# Patient Record
Sex: MALE | Race: WHITE | NOT HISPANIC OR LATINO | Employment: OTHER | URBAN - METROPOLITAN AREA
[De-identification: names, ages, dates, MRNs, and addresses within clinical notes are randomized per-mention and may not be internally consistent; named-entity substitution may affect disease eponyms.]

---

## 2017-03-28 ENCOUNTER — OFFICE VISIT (OUTPATIENT)
Dept: INTERNAL MEDICINE | Facility: CLINIC | Age: 77
End: 2017-03-28
Payer: MEDICARE

## 2017-03-28 VITALS
DIASTOLIC BLOOD PRESSURE: 74 MMHG | WEIGHT: 126.13 LBS | HEART RATE: 68 BPM | BODY MASS INDEX: 22.34 KG/M2 | TEMPERATURE: 98 F | SYSTOLIC BLOOD PRESSURE: 124 MMHG

## 2017-03-28 DIAGNOSIS — G31.84 MILD COGNITIVE IMPAIRMENT WITH MEMORY LOSS: ICD-10-CM

## 2017-03-28 DIAGNOSIS — Z00.00 ROUTINE GENERAL MEDICAL EXAMINATION AT A HEALTH CARE FACILITY: Primary | ICD-10-CM

## 2017-03-28 DIAGNOSIS — Z72.0 TOBACCO USE: ICD-10-CM

## 2017-03-28 DIAGNOSIS — E78.5 HYPERLIPIDEMIA, UNSPECIFIED HYPERLIPIDEMIA TYPE: ICD-10-CM

## 2017-03-28 PROCEDURE — 99999 PR PBB SHADOW E&M-EST. PATIENT-LVL III: CPT | Mod: PBBFAC,,, | Performed by: FAMILY MEDICINE

## 2017-03-28 PROCEDURE — 99499 UNLISTED E&M SERVICE: CPT | Mod: S$GLB,,, | Performed by: FAMILY MEDICINE

## 2017-03-28 PROCEDURE — 99397 PER PM REEVAL EST PAT 65+ YR: CPT | Mod: S$GLB,,, | Performed by: FAMILY MEDICINE

## 2017-03-29 NOTE — PROGRESS NOTES
Subjective:       Patient ID: Lavon Vanegas is a 77 y.o. male.    Chief Complaint: Annual Exam    HPI Comments: Patient presents to clinic today for annual physical exam. Granddaughter is present today to translate. She reports patient is being treated for dementia by Neurology.    Review of Systems   Constitutional: Negative for chills, fatigue, fever and unexpected weight change.   HENT: Negative for congestion, dental problem, ear pain, hearing loss, rhinorrhea and trouble swallowing.    Eyes: Negative for pain and visual disturbance.   Respiratory: Negative for cough and shortness of breath.    Cardiovascular: Negative for chest pain, palpitations and leg swelling.   Gastrointestinal: Negative for abdominal distention, abdominal pain, blood in stool, constipation, diarrhea, nausea and vomiting.   Genitourinary: Negative for difficulty urinating, scrotal swelling and testicular pain.   Musculoskeletal: Negative for arthralgias and myalgias.   Skin: Negative for rash.   Neurological: Negative for dizziness, weakness, numbness and headaches.   Hematological: Negative for adenopathy. Does not bruise/bleed easily.   Psychiatric/Behavioral: Negative for dysphoric mood and sleep disturbance. The patient is not nervous/anxious.        Objective:      Physical Exam   Constitutional: He is oriented to person, place, and time. He appears well-developed and well-nourished. No distress.   HENT:   Head: Normocephalic and atraumatic.   Right Ear: Hearing, tympanic membrane, external ear and ear canal normal.   Left Ear: Hearing, tympanic membrane, external ear and ear canal normal.   Nose: Nose normal.   Mouth/Throat: Uvula is midline, oropharynx is clear and moist and mucous membranes are normal.   Eyes: Conjunctivae, EOM and lids are normal. Pupils are equal, round, and reactive to light. No scleral icterus.   Neck: Normal range of motion. Neck supple. No thyromegaly present.   Cardiovascular: Normal rate and regular rhythm.  Exam  reveals no gallop and no friction rub.    No murmur heard.  Pulmonary/Chest: Effort normal and breath sounds normal. He has no wheezes. He has no rales.   Abdominal: Soft. Bowel sounds are normal. He exhibits no distension and no mass. There is no hepatosplenomegaly. There is no tenderness.   Musculoskeletal: Normal range of motion. He exhibits no edema or tenderness.   Lymphadenopathy:     He has no cervical adenopathy.   Neurological: He is alert and oriented to person, place, and time.   Reflex Scores:       Patellar reflexes are 2+ on the right side and 2+ on the left side.  Skin: Skin is warm and dry. No rash noted.   Psychiatric: He has a normal mood and affect.   Vitals reviewed.      Assessment:       1. Routine general medical examination at a health care facility    2. Hyperlipidemia, unspecified hyperlipidemia type    3. Tobacco use    4. Mild cognitive impairment with memory loss        Plan:   Doll was seen today for annual exam.    Diagnoses and all orders for this visit:    Routine general medical examination at a health care facility    Hyperlipidemia, unspecified hyperlipidemia type  Comments:  patient/family do not desire to continue labs/monitoring    Tobacco use  Comments:  encouraged cessation, patient does not want to quit, family states they have tried to get him to stop without success    Mild cognitive impairment with memory loss  Comments:  followed by Neurology    - Health Maintenance reviewed. Family/patient do not wish to proceed with health screenings at this time. Advised to notify office if they change their mind.   - Return prn

## 2017-07-12 ENCOUNTER — OFFICE VISIT (OUTPATIENT)
Dept: INTERNAL MEDICINE | Facility: CLINIC | Age: 77
End: 2017-07-12
Payer: MEDICARE

## 2017-07-12 VITALS
WEIGHT: 122.13 LBS | HEIGHT: 63 IN | HEART RATE: 57 BPM | DIASTOLIC BLOOD PRESSURE: 72 MMHG | TEMPERATURE: 98 F | BODY MASS INDEX: 21.64 KG/M2 | OXYGEN SATURATION: 99 % | SYSTOLIC BLOOD PRESSURE: 150 MMHG

## 2017-07-12 DIAGNOSIS — F03.91 DEMENTIA WITH BEHAVIORAL DISTURBANCE, UNSPECIFIED DEMENTIA TYPE: ICD-10-CM

## 2017-07-12 DIAGNOSIS — F03.91 DEMENTIA WITH BEHAVIORAL DISTURBANCE, UNSPECIFIED DEMENTIA TYPE: Primary | ICD-10-CM

## 2017-07-12 PROCEDURE — 99214 OFFICE O/P EST MOD 30 MIN: CPT | Mod: S$GLB,,, | Performed by: FAMILY MEDICINE

## 2017-07-12 PROCEDURE — 99999 PR PBB SHADOW E&M-EST. PATIENT-LVL IV: CPT | Mod: PBBFAC,,, | Performed by: FAMILY MEDICINE

## 2017-07-12 PROCEDURE — 1159F MED LIST DOCD IN RCRD: CPT | Mod: S$GLB,,, | Performed by: FAMILY MEDICINE

## 2017-07-12 PROCEDURE — 99499 UNLISTED E&M SERVICE: CPT | Mod: S$GLB,,, | Performed by: FAMILY MEDICINE

## 2017-07-12 PROCEDURE — 1126F AMNT PAIN NOTED NONE PRSNT: CPT | Mod: S$GLB,,, | Performed by: FAMILY MEDICINE

## 2017-07-12 RX ORDER — QUETIAPINE FUMARATE 25 MG/1
25 TABLET, FILM COATED ORAL NIGHTLY
Qty: 30 TABLET | Refills: 2 | Status: SHIPPED | OUTPATIENT
Start: 2017-07-12 | End: 2017-07-31 | Stop reason: SDUPTHER

## 2017-07-12 RX ORDER — QUETIAPINE FUMARATE 25 MG/1
25 TABLET, FILM COATED ORAL NIGHTLY
Qty: 30 TABLET | Refills: 2 | Status: SHIPPED | OUTPATIENT
Start: 2017-07-12 | End: 2017-07-12 | Stop reason: SDUPTHER

## 2017-07-12 RX ORDER — QUETIAPINE FUMARATE 25 MG/1
25 TABLET, FILM COATED ORAL NIGHTLY
Qty: 30 TABLET | Refills: 2 | Status: CANCELLED | OUTPATIENT
Start: 2017-07-12 | End: 2018-07-12

## 2017-07-12 NOTE — PROGRESS NOTES
Subjective:       Patient ID: Lavon Vanegas is a 77 y.o. male.    Chief Complaint: Follow-up (ER visit) and Mental Health Problem    Patient presents to clinic today with his daughter, Sarah, and caregiver from assisted living facility. He was recently evaluated at [facility] for agitation/psychosis. I received paperwork from [facility] that shows they advised he follow up with PCP and psychiatry. No follow up planned with psychiatry per family report. Caregiver at assisted living facility states that patient does well most of the time, but gets agitated in the evenings and has been combative at times. Family reports he was brought to the ER by law enforcement in handcuffs and stated that he wanted to kill his wife. They report he said this as he was angry about being handcuffed and brought to ER. They reports he was discharged last Wednesday and he has been doing okay, they report agitation in the evenings. They have been giving him tylenol PM, but are unclear on who recommended that. No other medications were started at [facility] per family.      Review of Systems   Constitutional: Negative for chills, fatigue, fever and unexpected weight change.   Eyes: Negative for visual disturbance.   Respiratory: Negative for shortness of breath.    Cardiovascular: Negative for chest pain.   Musculoskeletal: Negative for myalgias.   Neurological: Negative for headaches.   Psychiatric/Behavioral: Positive for agitation, behavioral problems and sleep disturbance. Negative for dysphoric mood, hallucinations, self-injury and suicidal ideas. The patient is not nervous/anxious.        Objective:      Physical Exam   Constitutional: He appears well-developed and well-nourished. No distress.   Pleasant elderly gentleman   HENT:   Head: Normocephalic and atraumatic.   Eyes: Conjunctivae and EOM are normal. Pupils are equal, round, and reactive to light. No scleral icterus.   Pulmonary/Chest: Effort normal.   Neurological: He is alert. No cranial nerve  deficit. Gait normal.   Oriented to person   Psychiatric: He has a normal mood and affect. His behavior is normal. Cognition and memory are impaired. He expresses no homicidal and no suicidal ideation.   Vitals reviewed.      Assessment:       1. Dementia with behavioral disturbance, unspecified dementia type        Plan:   Lavon was seen today for follow-up and mental health problem.    Diagnoses and all orders for this visit:    Dementia with behavioral disturbance, unspecified dementia type  -     Ambulatory Referral to Neurology  -     Ambulatory Referral to Psychiatry  -     quetiapine (SEROQUEL) 25 MG Tab; Take 1 tablet (25 mg total) by mouth every evening.    Other orders  -     Cancel: quetiapine (SEROQUEL) 25 MG Tab; Take 1 tablet (25 mg total) by mouth every evening.    - paperwork reviewed from [facility]  - Will start patient on seroquel 25 mg qhs. Discussed with daughter and caregiver that his agitation/combativeness are consistent with sundowning in elderly dementia patient. - - - Will schedule follow up with Dr. Cobb. Will also have him see Psychiatry for opinion as well.  - Discussed with family that assisted living facility is not the safest place for him to be. Advised they look into placing him in nursing home dementia unit. Daughter expressed understanding.

## 2017-07-31 ENCOUNTER — OFFICE VISIT (OUTPATIENT)
Dept: NEUROLOGY | Facility: CLINIC | Age: 77
End: 2017-07-31
Payer: MEDICARE

## 2017-07-31 VITALS
SYSTOLIC BLOOD PRESSURE: 148 MMHG | HEART RATE: 60 BPM | DIASTOLIC BLOOD PRESSURE: 70 MMHG | BODY MASS INDEX: 22.61 KG/M2 | HEIGHT: 63 IN | WEIGHT: 127.63 LBS

## 2017-07-31 DIAGNOSIS — G30.1 LATE ONSET ALZHEIMER'S DISEASE WITH BEHAVIORAL DISTURBANCE: ICD-10-CM

## 2017-07-31 DIAGNOSIS — F03.91 DEMENTIA WITH BEHAVIORAL DISTURBANCE, UNSPECIFIED DEMENTIA TYPE: ICD-10-CM

## 2017-07-31 DIAGNOSIS — F02.818 LATE ONSET ALZHEIMER'S DISEASE WITH BEHAVIORAL DISTURBANCE: ICD-10-CM

## 2017-07-31 PROCEDURE — 1126F AMNT PAIN NOTED NONE PRSNT: CPT | Mod: S$GLB,,, | Performed by: PSYCHIATRY & NEUROLOGY

## 2017-07-31 PROCEDURE — 99999 PR PBB SHADOW E&M-EST. PATIENT-LVL III: CPT | Mod: PBBFAC,,, | Performed by: PSYCHIATRY & NEUROLOGY

## 2017-07-31 PROCEDURE — 99499 UNLISTED E&M SERVICE: CPT | Mod: S$GLB,,, | Performed by: PSYCHIATRY & NEUROLOGY

## 2017-07-31 PROCEDURE — 1159F MED LIST DOCD IN RCRD: CPT | Mod: S$GLB,,, | Performed by: PSYCHIATRY & NEUROLOGY

## 2017-07-31 PROCEDURE — 99214 OFFICE O/P EST MOD 30 MIN: CPT | Mod: S$GLB,,, | Performed by: PSYCHIATRY & NEUROLOGY

## 2017-07-31 RX ORDER — QUETIAPINE FUMARATE 50 MG/1
50 TABLET, FILM COATED ORAL NIGHTLY
Qty: 30 TABLET | Refills: 11 | Status: SHIPPED | OUTPATIENT
Start: 2017-07-31 | End: 2017-10-17 | Stop reason: SDUPTHER

## 2017-07-31 NOTE — LETTER
July 31, 2017    Doll Vanegas  [address] [address] [place] [address]             [facility]  [id number] [place] [address] Drive  [place] [address]  Phone: [phone]  Fax: [phone] Dear Mr. Vanegas:    You have developed dementia of the Alzheimer type and now require 24 hour a day care and supervision.  You cannot be left alone for any period of time.  You cannot drive a car.  Your family will have to manage your medications and provide care 24 hours a day for your safety.      If you have any questions or concerns, please don't hesitate to call.    Sincerely,        Gene Cobb MD

## 2017-07-31 NOTE — PROGRESS NOTES
This is a 77-year-old right-handed patient who has been previously followed by members of this department as well as this examiner for dementia of the Alzheimer type with late onset.  The patient returns with family members today who are acting as  but also who are the caregivers.  Patient does not speak English.    The family reports that the patient is now become increasingly angry and agitated.  He is even struck out at his wife on several occasions.  He was placed in an assisted living center, but only stay 2 weeks as he was very difficult to control.  Apparently, the patient was walking into other clients rooms.  He would get up and night and leave the facility.  He was agitated at times.  Because of the lack of control, he was brought back home and is now staying and a relative's home with 24-hour supervision.  For the last 2 weeks, he has been at the family home with supervision.    The patient's daughter-in-law indicates that the patient continues to have periods of agitation and restlessness.  He apparently is getting up at night and wandering around the home.  Also, he has been  from his wife because he becomes extremely agitated and tries to strike out her when they are left alone.  The family also indicates that when looking in a mirror in the bathroom, he will become confused as to who is in the mirror and begins talking to the image as if it were his father.  At other times, the image apparently is not someone he has good feelings 4 and will begin to strike at the mirror.    The patient has had some periods of incontinence of urine.  This does not occur very frequently.  He requires supervision and assistance for other activities of daily living.    The patient's past history, family history, and social history have been reviewed with the patient and the family members.  No changes are being made.    Review of systems is not possible with the patient is a does not speak English and  is very confused and disoriented.    PE:   VITAL SIGNS: Blood pressure 148/70, pulse 66, weight 57.9 kg, height 5 foot 3 inches, BMI 22.61  APPEARANCE: Well nourished, well developed, in no acute distress.  The patient was very quiet and facet quietly in his chair.  HEAD: Normocephalic, atraumatic.  EYES: PERRL. EOMI.  Non-icteric sclerae.    EARS: TM's intact. Light reflex normal. No retraction or perforation.    NOSE: Mucosa pink. Airway clear.  MOUTH & THROAT: No tonsillar enlargement. No pharyngeal erythema or exudate. No stridor.  NECK: Supple. No bruits.  CHEST: Lungs clear to auscultation.  CARDIOVASCULAR: Regular rhythm without significant murmurs.  ABDOMEN: Bowel sounds normal. Not distended. Soft. No tenderness or masses.  MUSCULOSKELETAL:  No bony deformity seen.    NEUROLOGIC:   Mental Status:   The patient is attentive to the environment and cooperative for the exam.  The patient has extreme difficulty even following one-step commands even with a .    Cranial Nerves: II-XII grossly intact. Fundoscopic exam was not possible due to lack of patient cooperation.  The extraocular muscles are intact in the cardinal directions of gaze.  No ptosis is present. Facial features are symmetrical.  Speech is normal in fluency, diction, and phrasing.  Tongue protrudes in the midline.    Gait and Station:  Good alternate armswing with normal gait.  Motor:  No downdrift of either arm when held at shoulder level.  Manual muscle testing of proximal and distal muscles of both upper and lower extremities is normal.  Motor examination may not be entirely reliable as patient was marginally cooperative.  Sensory: Sensory examination is unreliable.  Cerebellar:  Finger to nose done well.    No involuntary movements or tremor seen.  Reflexes:  Stretch reflexes are 2+ both upper and lower extremities.  Plantar stimulation is flexor bilaterally and no pathological reflexes are seen      ASSESSMENT:  1.  Alzheimer-like  dementia, late onset, with behavioral disorder    RECOMMENDATIONS:  1.  Increase Seroquel to 50 mg at bedtime  2.  The family was counseled that he may need nursing home placement.  At the present time, the family indicates that there are no family members to provide care for him 24 hours a day.  However if safety becomes an issue, would strongly recommend nursing home placement.

## 2017-07-31 NOTE — LETTER
July 31, 2017      Taylor Bone MD  [address] [place] [address] [address] [place] [address]           [facility]  [address] [place] [address] [place] [address]  Phone: [phone]  Fax: [phone]          Patient: Lavon Vanegas   MR Number: [id number]   YOB: 1940   Date of Visit: 7/31/2017       Dear Dr. Taylor Bone:    Thank you for referring Doll Vanegas to me for evaluation. Attached you will find relevant portions of my assessment and plan of care.    If you have questions, please do not hesitate to call me. I look forward to following Doll Vanegas along with you.    Sincerely,    Gene Cobb MD    Enclosure  CC:  No Recipients    If you would like to receive this communication electronically, please contact [email or web address] or [phone] to request more information on [employer] Link access.    For providers and/or their staff who would like to refer a patient to [facility], please contact us through our one-stop-shop provider referral line, [facility] , at [phone].    If you feel you have received this communication in error or would no longer like to receive these types of communications, please e-mail [email or web address]

## 2017-08-02 ENCOUNTER — HOSPITAL ENCOUNTER (OUTPATIENT)
Dept: RADIOLOGY | Facility: HOSPITAL | Age: 77
Discharge: HOME | End: 2017-08-02
Attending: PSYCHIATRY & NEUROLOGY
Payer: MEDICARE

## 2017-08-02 DIAGNOSIS — F02.818 LATE ONSET ALZHEIMER'S DISEASE WITH BEHAVIORAL DISTURBANCE: ICD-10-CM

## 2017-08-02 DIAGNOSIS — G30.1 LATE ONSET ALZHEIMER'S DISEASE WITH BEHAVIORAL DISTURBANCE: ICD-10-CM

## 2017-08-02 PROCEDURE — 70450 CT HEAD/BRAIN W/O DYE: CPT | Mod: TC

## 2017-08-08 DIAGNOSIS — G31.84 MILD COGNITIVE IMPAIRMENT WITH MEMORY LOSS: ICD-10-CM

## 2017-08-08 RX ORDER — RIVASTIGMINE TARTRATE 1.5 MG/1
CAPSULE ORAL
Qty: 60 CAPSULE | Refills: 11 | Status: SHIPPED | OUTPATIENT
Start: 2017-08-08 | End: 2018-06-11 | Stop reason: SDUPTHER

## 2017-08-08 NOTE — TELEPHONE ENCOUNTER
----- Message from Marina Pham LPN sent at 8/8/2017  1:27 PM CDT -----  Contact: pt    ----- Message -----  From: Zoe Husain  Sent: 8/8/2017  12:56 PM  To: Lizandro WRIGHT Staff    .1. What is the name of the medication you are requesting? Rivastigmine  2. What is the dose? ?  3. How do you take the medication? Orally, topically, etc? orally  4. How often do you take this medication? ?  5. Do you need a 30 day or 90 day supply? ?  6. How many refills are you requesting? ?  7. What is your preferred pharmacy and location of the pharmacy? .  [facility] [address] [facility] [place] [facility] [address] [address] [facility]  [address] [address] [facility] [address]  Phone: [phone] Fax: [phone]  8. Who can we contact with further questions? Pt at [phone]    Thank you

## 2017-08-16 ENCOUNTER — OFFICE VISIT (OUTPATIENT)
Dept: INTERNAL MEDICINE | Facility: CLINIC | Age: 77
End: 2017-08-16
Payer: MEDICARE

## 2017-08-16 VITALS
OXYGEN SATURATION: 97 % | BODY MASS INDEX: 23.25 KG/M2 | HEIGHT: 63 IN | HEART RATE: 62 BPM | SYSTOLIC BLOOD PRESSURE: 160 MMHG | TEMPERATURE: 98 F | DIASTOLIC BLOOD PRESSURE: 80 MMHG | WEIGHT: 131.19 LBS

## 2017-08-16 DIAGNOSIS — Z72.0 TOBACCO USE: ICD-10-CM

## 2017-08-16 DIAGNOSIS — I10 ESSENTIAL HYPERTENSION: Primary | ICD-10-CM

## 2017-08-16 DIAGNOSIS — G30.1 LATE ONSET ALZHEIMER'S DISEASE WITH BEHAVIORAL DISTURBANCE: ICD-10-CM

## 2017-08-16 DIAGNOSIS — F02.818 LATE ONSET ALZHEIMER'S DISEASE WITH BEHAVIORAL DISTURBANCE: ICD-10-CM

## 2017-08-16 PROCEDURE — 1126F AMNT PAIN NOTED NONE PRSNT: CPT | Mod: S$GLB,,, | Performed by: FAMILY MEDICINE

## 2017-08-16 PROCEDURE — 99999 PR PBB SHADOW E&M-EST. PATIENT-LVL III: CPT | Mod: PBBFAC,,, | Performed by: FAMILY MEDICINE

## 2017-08-16 PROCEDURE — 99499 UNLISTED E&M SERVICE: CPT | Mod: S$GLB,,, | Performed by: FAMILY MEDICINE

## 2017-08-16 PROCEDURE — 1159F MED LIST DOCD IN RCRD: CPT | Mod: S$GLB,,, | Performed by: FAMILY MEDICINE

## 2017-08-16 PROCEDURE — 3008F BODY MASS INDEX DOCD: CPT | Mod: S$GLB,,, | Performed by: FAMILY MEDICINE

## 2017-08-16 PROCEDURE — 99214 OFFICE O/P EST MOD 30 MIN: CPT | Mod: S$GLB,,, | Performed by: FAMILY MEDICINE

## 2017-08-16 RX ORDER — AMLODIPINE BESYLATE 5 MG/1
5 TABLET ORAL DAILY
Qty: 30 TABLET | Refills: 5 | Status: SHIPPED | OUTPATIENT
Start: 2017-08-16 | End: 2017-09-06

## 2017-08-16 RX ORDER — AMLODIPINE BESYLATE 5 MG/1
5 TABLET ORAL DAILY
Qty: 30 TABLET | Refills: 5 | Status: SHIPPED | OUTPATIENT
Start: 2017-08-16 | End: 2017-08-16 | Stop reason: SDUPTHER

## 2017-08-16 NOTE — PATIENT INSTRUCTIONS
Seek immediate medical attention for severe headache, vision changes, chest pain, shortness of breath, speech difficulty, altered mental status, tingling/numbness/weakness or other focal neurologic deficits.    Amlodipine tablets  What is this medicine?  AMLODIPINE (am GUILLERMINA andrade) is a calcium-channel blocker. It affects the amount of calcium found in your heart and muscle cells. This relaxes your blood vessels, which can reduce the amount of work the heart has to do. This medicine is used to lower high blood pressure. It is also used to prevent chest pain.  How should I use this medicine?  Take this medicine by mouth with a glass of water. Follow the directions on the prescription label. Take your medicine at regular intervals. Do not take more medicine than directed.  Talk to your pediatrician regarding the use of this medicine in children. Special care may be needed. This medicine has been used in children as young as 6.  Persons over 65 years old may have a stronger reaction to this medicine and need smaller doses.  What side effects may I notice from receiving this medicine?  Side effects that you should report to your doctor or health care professional as soon as possible:  · allergic reactions like skin rash, itching or hives, swelling of the face, lips, or tongue  · breathing problems  · changes in vision or hearing  · chest pain  · fast, irregular heartbeat  · swelling of legs or ankles  Side effects that usually do not require medical attention (report to your doctor or health care professional if they continue or are bothersome):  · dry mouth  · facial flushing  · nausea, vomiting  · stomach gas, pain  · tired, weak  · trouble sleeping  What may interact with this medicine?  · herbal or dietary supplements  · local or general anesthetics  · medicines for high blood pressure  · medicines for prostate problems  · rifampin  What if I miss a dose?  If you miss a dose, take it as soon as you can. If it is  almost time for your next dose, take only that dose. Do not take double or extra doses.  Where should I keep my medicine?  Keep out of the reach of children.  Store at room temperature between 59 and 86 degrees F (15 and 30 degrees C). Protect from light. Keep container tightly closed. Throw away any unused medicine after the expiration date.  What should I tell my health care provider before I take this medicine?  They need to know if you have any of these conditions:  · heart problems like heart failure or aortic stenosis  · liver disease  · an unusual or allergic reaction to amlodipine, other medicines, foods, dyes, or preservatives  · pregnant or trying to get pregnant  · breast-feeding  What should I watch for while using this medicine?  Visit your doctor or health care professional for regular check ups. Check your blood pressure and pulse rate regularly. Ask your health care professional what your blood pressure and pulse rate should be, and when you should contact him or her.  This medicine may make you feel confused, dizzy or lightheaded. Do not drive, use machinery, or do anything that needs mental alertness until you know how this medicine affects you. To reduce the risk of dizzy or fainting spells, do not sit or stand up quickly, especially if you are an older patient. Avoid alcoholic drinks; they can make you more dizzy.  Do not suddenly stop taking amlodipine. Ask your doctor or health care professional how you can gradually reduce the dose.  Date Last Reviewed:   NOTE:This sheet is a summary. It may not cover all possible information. If you have questions about this medicine, talk to your doctor, pharmacist, or health care provider. Copyright© 2016 [employer]

## 2017-08-16 NOTE — PROGRESS NOTES
Subjective:       Patient ID: Lavon Vanegas is a 77 y.o. male.    Chief Complaint: Follow-up (blood pressure check)    Patient presents to clinic today for followup of blood pressure. Family reports patient is living at home with his son who watches him 24 hours a day. They are interested in hiring 24 hour sitter. They are not yet interested in nursing home placement. Family reports that he talks about sex all the time. They reports his agitation/aggressiveness has resolved with increase in seroquel. Patient/family are otherwise without concerns today.          Review of Systems   Constitutional: Negative for chills, fatigue, fever and unexpected weight change.   Eyes: Negative for visual disturbance.   Respiratory: Negative for shortness of breath.    Cardiovascular: Negative for chest pain.   Musculoskeletal: Negative for myalgias.   Neurological: Negative for headaches.       Objective:      Physical Exam   Constitutional: He appears well-developed and well-nourished. No distress.   HENT:   Head: Normocephalic and atraumatic.   Eyes: Conjunctivae and EOM are normal. Pupils are equal, round, and reactive to light. No scleral icterus.   Cardiovascular: Normal rate and regular rhythm.  Exam reveals no gallop and no friction rub.    No murmur heard.  Pulmonary/Chest: Effort normal and breath sounds normal.   Neurological: He is alert. No cranial nerve deficit. Gait normal.   Psychiatric: He has a normal mood and affect.   Vitals reviewed.      Assessment:       1. Essential hypertension    2. Late onset Alzheimer's disease with behavioral disturbance    3. Tobacco use        Plan:     Problem List Items Addressed This Visit     Tobacco use    Current Assessment & Plan     Encouraged smoking cessation         Late onset Alzheimer's disease with behavioral disturbance    Current Assessment & Plan     Stable, followed by Dr. Cobb, Neurology; encouraged family to consider nursing home placement/dementia unit, they are not  interested at this time, but are considering 24 hour sitter in home setting. Given resource guide for Seniors.         Essential hypertension - Primary    Current Assessment & Plan     Start amlodipine 5 mg daily, discussed stroke precautions, follow up in 2 weeks         Relevant Medications    amlodipine (NORVASC) 5 MG tablet      Other Visit Diagnoses    None.

## 2017-08-16 NOTE — ASSESSMENT & PLAN NOTE
Demetrius, followed by Dr. Cobb, Neurology; encouraged family to consider nursing home placement/dementia unit, they are not interested at this time, but are considering 24 hour sitter in home setting. Given resource guide for Seniors.

## 2017-09-06 ENCOUNTER — OFFICE VISIT (OUTPATIENT)
Dept: INTERNAL MEDICINE | Facility: CLINIC | Age: 77
End: 2017-09-06
Payer: MEDICARE

## 2017-09-06 VITALS
HEIGHT: 63 IN | SYSTOLIC BLOOD PRESSURE: 146 MMHG | DIASTOLIC BLOOD PRESSURE: 80 MMHG | WEIGHT: 135.81 LBS | TEMPERATURE: 96 F | OXYGEN SATURATION: 97 % | BODY MASS INDEX: 24.06 KG/M2 | HEART RATE: 59 BPM

## 2017-09-06 DIAGNOSIS — I10 ESSENTIAL HYPERTENSION: Primary | ICD-10-CM

## 2017-09-06 PROCEDURE — 99214 OFFICE O/P EST MOD 30 MIN: CPT | Mod: S$GLB,,, | Performed by: NURSE PRACTITIONER

## 2017-09-06 PROCEDURE — 3008F BODY MASS INDEX DOCD: CPT | Mod: S$GLB,,, | Performed by: NURSE PRACTITIONER

## 2017-09-06 PROCEDURE — 1159F MED LIST DOCD IN RCRD: CPT | Mod: S$GLB,,, | Performed by: NURSE PRACTITIONER

## 2017-09-06 PROCEDURE — 1126F AMNT PAIN NOTED NONE PRSNT: CPT | Mod: S$GLB,,, | Performed by: NURSE PRACTITIONER

## 2017-09-06 PROCEDURE — 99999 PR PBB SHADOW E&M-EST. PATIENT-LVL III: CPT | Mod: PBBFAC,,, | Performed by: NURSE PRACTITIONER

## 2017-09-06 PROCEDURE — 99499 UNLISTED E&M SERVICE: CPT | Mod: S$GLB,,, | Performed by: NURSE PRACTITIONER

## 2017-09-06 NOTE — PROGRESS NOTES
"Subjective:       Patient ID: Lavon Vanegas is a 77 y.o. male.    Chief Complaint: follow up blood pressure    Patient presents to clinic today for followup of HTN. His wife and daughter are present and daughter translates. Per the daughter, she gave the patient amlodipine and "thirty minutes later he got a bad attitude and red faced". She gave me amlodipine as scheduled again the next day and reports same symptoms. After that, she reports the wife and she decided to not give him the medicine anymore. Daughter states the patient and his wife live alone while son is working and son will be home next month to assist.         Review of Systems   Constitutional: Negative for chills, fatigue, fever and unexpected weight change.   Eyes: Negative for visual disturbance.   Respiratory: Negative for shortness of breath.    Cardiovascular: Negative for chest pain.   Musculoskeletal: Negative for myalgias.   Neurological: Negative for headaches.   Psychiatric/Behavioral: Positive for behavioral problems and confusion.       Objective:      Physical Exam   Constitutional: He appears well-developed and well-nourished. No distress.   HENT:   Head: Normocephalic and atraumatic.   Cardiovascular: Normal rate, regular rhythm and normal heart sounds.  Exam reveals no friction rub.    No murmur heard.  Pulmonary/Chest: Effort normal and breath sounds normal.   Skin: He is not diaphoretic.   Vitals reviewed.      Assessment:       1. Essential hypertension        Plan:   Essential hypertension  Comments:  not controlled, per daughter, does not want to take medications at this time due to behavior concerns and lack of resources at home      Discussed medication compliance and side effects. Discussed stroke precautions.   Daughter states that the patient's son will be home next month and bring patient back to be checked. If blood pressure still elevated, they will consider restarting medication.  Return in about 7 weeks (around 10/25/2017), or " if symptoms worsen or fail to improve, for BP recheck.

## 2017-09-20 ENCOUNTER — TELEPHONE (OUTPATIENT)
Dept: NEUROLOGY | Facility: CLINIC | Age: 77
End: 2017-09-20

## 2017-09-20 NOTE — TELEPHONE ENCOUNTER
Please print out his results so I can mail to him/ they are not answering calls or returning calls from me to give him his results from 8-2-17

## 2017-09-20 NOTE — TELEPHONE ENCOUNTER
----- Message from Gene Cobb MD sent at 9/8/2017  1:06 PM CDT -----  The CT scan shows only the changes that occur with advancing age.

## 2017-10-05 ENCOUNTER — TELEPHONE (OUTPATIENT)
Dept: INTERNAL MEDICINE | Facility: CLINIC | Age: 77
End: 2017-10-05

## 2017-10-05 NOTE — TELEPHONE ENCOUNTER
----- Message from Carlie Aldridge sent at 10/5/2017 10:02 AM CDT -----  Contact: pt   Pt daughter calling about medication,, they need to change medication, she doesn't believe it works,,, please call pt back at [phone]

## 2017-10-05 NOTE — TELEPHONE ENCOUNTER
Ms. Smith called in to state she isn't sure the medication prescribed by Dr. Cobb is working and she would like to know if it could be changed. I advised patient's daughter I would forward her message up to Dr. Cobb's staff. (She wasn't certain of the medication's name). Please advise.

## 2017-10-17 ENCOUNTER — OFFICE VISIT (OUTPATIENT)
Dept: NEUROLOGY | Facility: CLINIC | Age: 77
End: 2017-10-17
Payer: MEDICARE

## 2017-10-17 VITALS
HEIGHT: 61 IN | WEIGHT: 134.69 LBS | BODY MASS INDEX: 25.43 KG/M2 | SYSTOLIC BLOOD PRESSURE: 130 MMHG | DIASTOLIC BLOOD PRESSURE: 68 MMHG | HEART RATE: 64 BPM

## 2017-10-17 DIAGNOSIS — G31.09 OTHER FRONTOTEMPORAL DEMENTIA WITH BEHAVIORAL DISTURBANCE: Primary | ICD-10-CM

## 2017-10-17 DIAGNOSIS — F02.818 OTHER FRONTOTEMPORAL DEMENTIA WITH BEHAVIORAL DISTURBANCE: Primary | ICD-10-CM

## 2017-10-17 DIAGNOSIS — F02.818 LATE ONSET ALZHEIMER'S DISEASE WITH BEHAVIORAL DISTURBANCE: ICD-10-CM

## 2017-10-17 DIAGNOSIS — G30.1 LATE ONSET ALZHEIMER'S DISEASE WITH BEHAVIORAL DISTURBANCE: ICD-10-CM

## 2017-10-17 PROCEDURE — 99999 PR PBB SHADOW E&M-EST. PATIENT-LVL III: CPT | Mod: PBBFAC,,, | Performed by: PSYCHIATRY & NEUROLOGY

## 2017-10-17 PROCEDURE — 99499 UNLISTED E&M SERVICE: CPT | Mod: S$GLB,,, | Performed by: PSYCHIATRY & NEUROLOGY

## 2017-10-17 PROCEDURE — 99214 OFFICE O/P EST MOD 30 MIN: CPT | Mod: S$GLB,,, | Performed by: PSYCHIATRY & NEUROLOGY

## 2017-10-17 RX ORDER — QUETIAPINE FUMARATE 25 MG/1
75 TABLET, FILM COATED ORAL NIGHTLY
Qty: 90 TABLET | Refills: 11 | Status: SHIPPED | OUTPATIENT
Start: 2017-10-17 | End: 2018-06-11 | Stop reason: SINTOL

## 2017-10-17 RX ORDER — AMLODIPINE BESYLATE 5 MG/1
5 TABLET ORAL DAILY
COMMUNITY
End: 2017-10-25
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
## 2017-10-20 ENCOUNTER — TELEPHONE (OUTPATIENT)
Dept: ADMINISTRATIVE | Facility: HOSPITAL | Age: 77
End: 2017-10-20

## 2017-10-20 NOTE — TELEPHONE ENCOUNTER
Ms Sarah-daughter would like to talk with nurse about medicine for Mr Vanegas. States medicine dr clements helps at night to emilio him down but his wife states in the am when he gets up is when he is mean. Would like to know if would be ok to change the medicine unto the morning.

## 2017-10-24 ENCOUNTER — TELEPHONE (OUTPATIENT)
Dept: NEUROLOGY | Facility: CLINIC | Age: 77
End: 2017-10-24

## 2017-10-24 NOTE — TELEPHONE ENCOUNTER
----- Message from Kennedi Carrillo sent at 10/24/2017  8:07 AM CDT -----  Pt granddaughter( Miss Preston) at [phone]//states is calling to speak with you regarding pt's medication//pt had a mini stroke last night//please call to discuss//thanks/shashank

## 2017-10-24 NOTE — TELEPHONE ENCOUNTER
----- Message from Mina Olmedo sent at 10/24/2017  2:45 PM CDT -----  Sophia ( pt granddaughter ) is requesting a call from nurse to discuss the pt medication increase do to a stoke.          Please call Sophia ( pt granddaughter ) back at [phone]

## 2017-10-24 NOTE — TELEPHONE ENCOUNTER
Patient's granddaughter stated that patient has not slept for several days. She complains that the lack of sleep started after the seroquel was increased. Patient is requesting medication to help him sleep. Please advise.

## 2017-10-25 ENCOUNTER — OFFICE VISIT (OUTPATIENT)
Dept: INTERNAL MEDICINE | Facility: CLINIC | Age: 77
End: 2017-10-25
Payer: MEDICARE

## 2017-10-25 VITALS
RESPIRATION RATE: 18 BRPM | BODY MASS INDEX: 24.87 KG/M2 | SYSTOLIC BLOOD PRESSURE: 122 MMHG | HEIGHT: 62 IN | HEART RATE: 58 BPM | TEMPERATURE: 98 F | OXYGEN SATURATION: 97 % | WEIGHT: 135.13 LBS | DIASTOLIC BLOOD PRESSURE: 72 MMHG

## 2017-10-25 DIAGNOSIS — I10 ESSENTIAL HYPERTENSION: Primary | ICD-10-CM

## 2017-10-25 PROCEDURE — 99999 PR PBB SHADOW E&M-EST. PATIENT-LVL III: CPT | Mod: PBBFAC,,, | Performed by: NURSE PRACTITIONER

## 2017-10-25 PROCEDURE — 99499 UNLISTED E&M SERVICE: CPT | Mod: S$GLB,,, | Performed by: NURSE PRACTITIONER

## 2017-10-25 PROCEDURE — 99213 OFFICE O/P EST LOW 20 MIN: CPT | Mod: S$GLB,,, | Performed by: NURSE PRACTITIONER

## 2017-10-25 NOTE — PROGRESS NOTES
Subjective:       Patient ID: Lavon Vanegas is a 77 y.o. male.    Chief Complaint: Follow-up (HTN. Pt stopped taking BP med because it made him feel bad. BP noraml today)    Patient presents for BP follow up. His daughter is present to translate. She reports his blood pressure has been normal at home.      Review of Systems   Constitutional: Negative for chills, fatigue, fever and unexpected weight change.   Eyes: Negative for visual disturbance.   Respiratory: Negative for shortness of breath.    Cardiovascular: Negative for chest pain.   Musculoskeletal: Negative for myalgias.   Neurological: Negative for headaches.   Psychiatric/Behavioral: Positive for confusion (alzheimer's).       Objective:      Physical Exam   Constitutional: He appears well-developed and well-nourished. No distress.   HENT:   Head: Normocephalic and atraumatic.   Cardiovascular: Normal rate and regular rhythm.  Exam reveals no friction rub.    No murmur heard.  Pulmonary/Chest: Effort normal and breath sounds normal.   Skin: He is not diaphoretic.   Psychiatric: He has a normal mood and affect.   Vitals reviewed.      Assessment:       1. Essential hypertension        Plan:   Essential hypertension  Comments:  controlled off amlodipine, monitor at home, notify if persistently elevated        Return in about 5 months (around 3/25/2018), or if symptoms worsen or fail to improve, for annual wellness/EPP with Dr. Bone.

## 2018-03-21 ENCOUNTER — PATIENT OUTREACH (OUTPATIENT)
Dept: ADMINISTRATIVE | Facility: HOSPITAL | Age: 78
End: 2018-03-21

## 2018-06-11 ENCOUNTER — OFFICE VISIT (OUTPATIENT)
Dept: NEUROLOGY | Facility: CLINIC | Age: 78
End: 2018-06-11
Payer: MEDICARE

## 2018-06-11 VITALS
SYSTOLIC BLOOD PRESSURE: 132 MMHG | WEIGHT: 130.31 LBS | HEIGHT: 61 IN | HEART RATE: 60 BPM | BODY MASS INDEX: 24.6 KG/M2 | DIASTOLIC BLOOD PRESSURE: 70 MMHG

## 2018-06-11 DIAGNOSIS — G31.84 MILD COGNITIVE IMPAIRMENT WITH MEMORY LOSS: ICD-10-CM

## 2018-06-11 DIAGNOSIS — F02.818 LATE ONSET ALZHEIMER'S DISEASE WITH BEHAVIORAL DISTURBANCE: Primary | ICD-10-CM

## 2018-06-11 DIAGNOSIS — G30.1 LATE ONSET ALZHEIMER'S DISEASE WITH BEHAVIORAL DISTURBANCE: Primary | ICD-10-CM

## 2018-06-11 PROCEDURE — 99214 OFFICE O/P EST MOD 30 MIN: CPT | Mod: S$GLB,,, | Performed by: PSYCHIATRY & NEUROLOGY

## 2018-06-11 PROCEDURE — 3078F DIAST BP <80 MM HG: CPT | Mod: CPTII,S$GLB,, | Performed by: PSYCHIATRY & NEUROLOGY

## 2018-06-11 PROCEDURE — 99499 UNLISTED E&M SERVICE: CPT | Mod: S$GLB,,, | Performed by: PSYCHIATRY & NEUROLOGY

## 2018-06-11 PROCEDURE — 3075F SYST BP GE 130 - 139MM HG: CPT | Mod: CPTII,S$GLB,, | Performed by: PSYCHIATRY & NEUROLOGY

## 2018-06-11 PROCEDURE — 99999 PR PBB SHADOW E&M-EST. PATIENT-LVL III: CPT | Mod: PBBFAC,,, | Performed by: PSYCHIATRY & NEUROLOGY

## 2018-06-11 RX ORDER — AMLODIPINE BESYLATE 5 MG/1
5 TABLET ORAL DAILY
COMMUNITY
End: 2018-07-12 | Stop reason: SDUPTHER

## 2018-06-11 RX ORDER — TRAZODONE HYDROCHLORIDE 50 MG/1
50 TABLET ORAL NIGHTLY
Qty: 30 TABLET | Refills: 11 | Status: SHIPPED | OUTPATIENT
Start: 2018-06-11 | End: 2018-10-29

## 2018-06-11 RX ORDER — RIVASTIGMINE TARTRATE 3 MG/1
CAPSULE ORAL
Qty: 60 CAPSULE | Refills: 11 | Status: SHIPPED | OUTPATIENT
Start: 2018-06-11 | End: 2018-08-14 | Stop reason: DRUGHIGH

## 2018-06-11 NOTE — PROGRESS NOTES
This is an 78-year-old patient who has an established diagnosis of Alzheimer like dementia, late onset, with behavioral disturbance.  The patient is accompanied to the office by his wife and a daughter who is able to answer questions as the patient does not speak English.  The daughter indicates that the patient has been having increasing difficulty at home with aggression and anger management.  He is also had some difficulties with sleep at night frequently awakening and then wandering around the house at night.  The family is attempting to keep him at home as long as possible but someone has to be with him all day long.  Unfortunately, the patient is being rotated from [facility] to another.    The patient has not had any active seizures.  He is able to eat after set up.  He requires assistance for dressing and bathing.  He frequently resist bathing.  He has not been incontinent, but occasionally will just walk up to a corner of the room to urinate or will go outside and urinate on the lawn.  He was not able to tolerate Seroquel as this produced too much drowsiness sedation for the family to manage.    The patient has not had any inter current illness.  He has not had any fever or chills.  He has been healthy otherwise.  The patient's review of systems is otherwise not reliable due to the language barrier.    The patient's past history, family history and social history were reviewed with the patient's daughter and the medical records.    PE:   VITAL SIGNS:  Blood pressure 132/70, pulse 68 and regular, weight 59.1 kg, height 5 ft 1 in, BMI 24.62  APPEARANCE: Well nourished, well developed, in no acute distress.    HEAD: Normocephalic, atraumatic.  EYES: PERRL. EOMI.  Non-icteric sclerae.    EARS: TM's intact. Light reflex normal. No retraction or perforation.    NOSE: Mucosa pink. Airway clear.  MOUTH & THROAT: No tonsillar enlargement. No pharyngeal erythema or exudate. No stridor.  NECK: Supple. No  bruits.  CHEST: Lungs clear to auscultation.  CARDIOVASCULAR: Regular rhythm without significant murmurs.  ABDOMEN: Bowel sounds normal. Not distended. Soft. No tenderness or masses.  MUSCULOSKELETAL:  No bony deformity seen.  Muscle tone is normal.  Muscle mass is normal.  NEUROLOGIC:   Mental Status:    The patient is attentive to the environment but was only minimally cooperative for the exam.  He orientation could not be assessed accurately.  He had significant difficulties following even one-step command.  Cranial Nerves: II-XII grossly intact. Fundoscopic exam is normal.  No hemorrhage, exudate or papilledema is present. The extraocular muscles are intact in the cardinal directions of gaze.  No ptosis is present. Facial features are symmetrical.   Tongue protrudes in the midline.    Gait and Station:    Good alternate armswing with normal gait.  Motor:  No downdrift of either arm when held at shoulder level.  Manual muscle testing of proximal and distal muscles of both upper and lower extremities is normal.   Sensory:  Sensory examination is unremarkable  Cerebellar:  Finger to nose done well.  No resting tremor or intention tremor was seen.  Reflexes:  Stretch reflexes are 2+ both upper and lower extremities.  Plantar stimulation is flexor bilaterally and no pathological reflexes are seen        Assessment  Today's examination is consistent with the previous examination on this patient which is somewhat difficult because of the language barrier.  He apparently is showing progression of his dementia with nocturnal wandering, and now becoming more dependent upon others for activities of daily living.    Recommendations  1.  Increase Exelon to 3 mg b.i.d.  2.  Trial of trazodone 50 mg at bedtime to increase sleeping pattern  3.  Routine follow-up in 6 months.    This was a 35 min visit with the patient, his wife, and the daughter with over 50% of the time spent counseling the daughter regarding the expected  progression of events in dementia and its treatment.  This note is generated with speech recognition software and is subject to transcription error and sound alike phrases that may be missed by proofreading.

## 2018-06-21 DIAGNOSIS — G31.84 MILD COGNITIVE IMPAIRMENT WITH MEMORY LOSS: ICD-10-CM

## 2018-06-22 RX ORDER — RIVASTIGMINE TARTRATE 1.5 MG/1
CAPSULE ORAL
Qty: 180 CAPSULE | Refills: 9 | Status: SHIPPED | OUTPATIENT
Start: 2018-06-22 | End: 2018-08-14 | Stop reason: DRUGHIGH

## 2018-07-11 DIAGNOSIS — I10 ESSENTIAL HYPERTENSION: ICD-10-CM

## 2018-07-12 DIAGNOSIS — I10 ESSENTIAL HYPERTENSION: ICD-10-CM

## 2018-07-12 RX ORDER — AMLODIPINE BESYLATE 5 MG/1
TABLET ORAL
Qty: 90 TABLET | Refills: 0 | Status: SHIPPED | OUTPATIENT
Start: 2018-07-12 | End: 2018-09-20 | Stop reason: SDUPTHER

## 2018-07-12 RX ORDER — AMLODIPINE BESYLATE 5 MG/1
TABLET ORAL
Qty: 30 TABLET | Refills: 0 | Status: SHIPPED | OUTPATIENT
Start: 2018-07-12 | End: 2018-07-12 | Stop reason: SDUPTHER

## 2018-07-12 NOTE — TELEPHONE ENCOUNTER
Spoke with the patients daughter in law and she states she will call the office to schedule the appointment after speaking with the patient.

## 2018-08-14 DIAGNOSIS — G31.84 MILD COGNITIVE IMPAIRMENT WITH MEMORY LOSS: ICD-10-CM

## 2018-08-14 RX ORDER — RIVASTIGMINE TARTRATE 6 MG/1
6 CAPSULE ORAL 2 TIMES DAILY
Qty: 60 CAPSULE | Refills: 11 | Status: SHIPPED | OUTPATIENT
Start: 2018-08-14 | End: 2018-10-29

## 2018-08-14 RX ORDER — RIVASTIGMINE TARTRATE 1.5 MG/1
CAPSULE ORAL
Qty: 60 CAPSULE | Refills: 0 | OUTPATIENT
Start: 2018-08-14

## 2018-09-20 DIAGNOSIS — I10 ESSENTIAL HYPERTENSION: ICD-10-CM

## 2018-09-20 RX ORDER — AMLODIPINE BESYLATE 5 MG/1
TABLET ORAL
Qty: 90 TABLET | Refills: 0 | OUTPATIENT
Start: 2018-09-20

## 2018-09-20 RX ORDER — AMLODIPINE BESYLATE 5 MG/1
TABLET ORAL
Qty: 30 TABLET | Refills: 0 | Status: SHIPPED | OUTPATIENT
Start: 2018-09-20 | End: 2018-10-29 | Stop reason: SDUPTHER

## 2018-09-21 DIAGNOSIS — F03.91 DEMENTIA WITH BEHAVIORAL DISTURBANCE, UNSPECIFIED DEMENTIA TYPE: ICD-10-CM

## 2018-09-21 RX ORDER — QUETIAPINE FUMARATE 50 MG/1
50 TABLET, FILM COATED ORAL NIGHTLY
Qty: 30 TABLET | Refills: 11 | Status: SHIPPED | OUTPATIENT
Start: 2018-09-21 | End: 2019-05-29

## 2018-10-28 NOTE — PROGRESS NOTES
Subjective:       Patient ID: Lavon Vanegas is a 78 y.o. male.    Chief Complaint: Annual Exam    Patient presents to clinic today for annual physical exam. Family member, Sheela, present. She assists with interpreting due to language barrier.       Review of Systems   Constitutional: Negative for chills, fatigue, fever and unexpected weight change.   HENT: Negative for congestion, dental problem, ear pain, hearing loss, rhinorrhea and trouble swallowing.    Eyes: Negative for pain and visual disturbance.   Respiratory: Negative for cough and shortness of breath.    Cardiovascular: Negative for chest pain, palpitations and leg swelling.   Gastrointestinal: Negative for abdominal distention, abdominal pain, blood in stool, constipation, diarrhea, nausea and vomiting.   Genitourinary: Negative for difficulty urinating, scrotal swelling and testicular pain.   Musculoskeletal: Negative for arthralgias and myalgias.   Skin: Negative for rash.   Neurological: Negative for dizziness, weakness, numbness and headaches.   Hematological: Negative for adenopathy. Does not bruise/bleed easily.   Psychiatric/Behavioral: Negative for dysphoric mood and sleep disturbance. The patient is not nervous/anxious.        Objective:      Physical Exam   Constitutional: He is oriented to person, place, and time. He appears well-developed and well-nourished. No distress.   HENT:   Head: Normocephalic and atraumatic.   Right Ear: Hearing, tympanic membrane, external ear and ear canal normal.   Left Ear: Hearing, tympanic membrane, external ear and ear canal normal.   Nose: Nose normal.   Mouth/Throat: Uvula is midline, oropharynx is clear and moist and mucous membranes are normal.   Eyes: Conjunctivae, EOM and lids are normal. Pupils are equal, round, and reactive to light. No scleral icterus.   Neck: Normal range of motion. Neck supple. No thyromegaly present.   Cardiovascular: Normal rate and regular rhythm. Exam reveals no gallop and no friction  rub.   No murmur heard.  Pulmonary/Chest: Effort normal and breath sounds normal. He has no wheezes. He has no rales.   Abdominal: Soft. Bowel sounds are normal. He exhibits no distension and no mass. There is no hepatosplenomegaly. There is no tenderness.   Musculoskeletal: Normal range of motion. He exhibits no edema or tenderness.   Lymphadenopathy:     He has no cervical adenopathy.   Neurological: He is alert and oriented to person, place, and time. No cranial nerve deficit. Coordination normal.   Reflex Scores:       Patellar reflexes are 2+ on the right side and 2+ on the left side.  Skin: Skin is warm and dry. No rash noted.   Psychiatric: He has a normal mood and affect.   Vitals reviewed.      Assessment:       1. Routine general medical examination at a health care facility    2. Essential hypertension    3. Hyperlipidemia, unspecified hyperlipidemia type    4. Late onset Alzheimer's disease with behavioral disturbance    5. Need for vaccination with 13-polyvalent pneumococcal conjugate vaccine        Plan:     Problem List Items Addressed This Visit     Hyperlipidemia    Current Assessment & Plan     Status pending labs         Relevant Orders    Lipid panel    Late onset Alzheimer's disease with behavioral disturbance    Current Assessment & Plan     Followed by Dr. Cobb, Neurology           Essential hypertension    Current Assessment & Plan     Controlled on amlodipine 5 mg daily         Relevant Medications    amLODIPine (NORVASC) 5 MG tablet    Other Relevant Orders    CBC auto differential    Comprehensive metabolic panel    TSH      Other Visit Diagnoses     Routine general medical examination at a health care facility    -  Primary    Need for vaccination with 13-polyvalent pneumococcal conjugate vaccine        Relevant Orders    Pneumococcal Conjugate Vaccine (13 Valent) (IM) (Completed)          Health Maintenance reviewed/updated. Flu vaccine today.

## 2018-10-29 ENCOUNTER — OFFICE VISIT (OUTPATIENT)
Dept: INTERNAL MEDICINE | Facility: CLINIC | Age: 78
End: 2018-10-29
Payer: MEDICARE

## 2018-10-29 VITALS
TEMPERATURE: 97 F | HEIGHT: 61 IN | BODY MASS INDEX: 24.69 KG/M2 | SYSTOLIC BLOOD PRESSURE: 128 MMHG | WEIGHT: 130.75 LBS | DIASTOLIC BLOOD PRESSURE: 70 MMHG | OXYGEN SATURATION: 97 % | HEART RATE: 63 BPM

## 2018-10-29 DIAGNOSIS — I10 ESSENTIAL HYPERTENSION: ICD-10-CM

## 2018-10-29 DIAGNOSIS — Z23 NEED FOR VACCINATION WITH 13-POLYVALENT PNEUMOCOCCAL CONJUGATE VACCINE: ICD-10-CM

## 2018-10-29 DIAGNOSIS — Z00.00 ROUTINE GENERAL MEDICAL EXAMINATION AT A HEALTH CARE FACILITY: Primary | ICD-10-CM

## 2018-10-29 DIAGNOSIS — E78.5 HYPERLIPIDEMIA, UNSPECIFIED HYPERLIPIDEMIA TYPE: ICD-10-CM

## 2018-10-29 DIAGNOSIS — G30.1 LATE ONSET ALZHEIMER'S DISEASE WITH BEHAVIORAL DISTURBANCE: ICD-10-CM

## 2018-10-29 DIAGNOSIS — F02.818 LATE ONSET ALZHEIMER'S DISEASE WITH BEHAVIORAL DISTURBANCE: ICD-10-CM

## 2018-10-29 PROCEDURE — 3074F SYST BP LT 130 MM HG: CPT | Mod: CPTII,S$GLB,, | Performed by: FAMILY MEDICINE

## 2018-10-29 PROCEDURE — 90670 PCV13 VACCINE IM: CPT | Mod: S$GLB,,, | Performed by: FAMILY MEDICINE

## 2018-10-29 PROCEDURE — 99397 PER PM REEVAL EST PAT 65+ YR: CPT | Mod: 25,S$GLB,, | Performed by: FAMILY MEDICINE

## 2018-10-29 PROCEDURE — G0008 ADMIN INFLUENZA VIRUS VAC: HCPCS | Mod: S$GLB,,, | Performed by: FAMILY MEDICINE

## 2018-10-29 PROCEDURE — G0009 ADMIN PNEUMOCOCCAL VACCINE: HCPCS | Mod: S$GLB,,, | Performed by: FAMILY MEDICINE

## 2018-10-29 PROCEDURE — 90662 IIV NO PRSV INCREASED AG IM: CPT | Mod: S$GLB,,, | Performed by: FAMILY MEDICINE

## 2018-10-29 PROCEDURE — 3078F DIAST BP <80 MM HG: CPT | Mod: CPTII,S$GLB,, | Performed by: FAMILY MEDICINE

## 2018-10-29 PROCEDURE — 99999 PR PBB SHADOW E&M-EST. PATIENT-LVL III: CPT | Mod: PBBFAC,,, | Performed by: FAMILY MEDICINE

## 2018-10-29 RX ORDER — RIVASTIGMINE TARTRATE 3 MG/1
CAPSULE ORAL
Refills: 11 | COMMUNITY
Start: 2018-09-20 | End: 2019-05-29 | Stop reason: SDUPTHER

## 2018-10-29 RX ORDER — AMLODIPINE BESYLATE 5 MG/1
5 TABLET ORAL DAILY
Qty: 90 TABLET | Refills: 1 | Status: SHIPPED | OUTPATIENT
Start: 2018-10-29 | End: 2019-03-30 | Stop reason: SDUPTHER

## 2018-11-12 ENCOUNTER — LAB VISIT (OUTPATIENT)
Dept: LAB | Facility: HOSPITAL | Age: 78
End: 2018-11-12
Attending: FAMILY MEDICINE
Payer: MEDICARE

## 2018-11-12 DIAGNOSIS — E78.5 HYPERLIPIDEMIA, UNSPECIFIED HYPERLIPIDEMIA TYPE: ICD-10-CM

## 2018-11-12 DIAGNOSIS — I10 ESSENTIAL HYPERTENSION: ICD-10-CM

## 2018-11-12 LAB
ALBUMIN SERPL BCP-MCNC: 3.8 G/DL
ALP SERPL-CCNC: 111 U/L
ALT SERPL W/O P-5'-P-CCNC: 30 U/L
ANION GAP SERPL CALC-SCNC: 10 MMOL/L
AST SERPL-CCNC: 25 U/L
BASOPHILS # BLD AUTO: 0.02 K/UL
BASOPHILS NFR BLD: 0.3 %
BILIRUB SERPL-MCNC: 0.7 MG/DL
BUN SERPL-MCNC: 20 MG/DL
CALCIUM SERPL-MCNC: 9.3 MG/DL
CHLORIDE SERPL-SCNC: 106 MMOL/L
CHOLEST SERPL-MCNC: 198 MG/DL
CHOLEST/HDLC SERPL: 5 {RATIO}
CO2 SERPL-SCNC: 25 MMOL/L
CREAT SERPL-MCNC: 1 MG/DL
DIFFERENTIAL METHOD: ABNORMAL
EOSINOPHIL # BLD AUTO: 0.2 K/UL
EOSINOPHIL NFR BLD: 3 %
ERYTHROCYTE [DISTWIDTH] IN BLOOD BY AUTOMATED COUNT: 14.4 %
EST. GFR  (AFRICAN AMERICAN): >60 ML/MIN/1.73 M^2
EST. GFR  (NON AFRICAN AMERICAN): >60 ML/MIN/1.73 M^2
GLUCOSE SERPL-MCNC: 89 MG/DL
HCT VFR BLD AUTO: 42.8 %
HDLC SERPL-MCNC: 40 MG/DL
HDLC SERPL: 20.2 %
HGB BLD-MCNC: 13.2 G/DL
IMM GRANULOCYTES # BLD AUTO: 0.02 K/UL
IMM GRANULOCYTES NFR BLD AUTO: 0.3 %
LDLC SERPL CALC-MCNC: 139.8 MG/DL
LYMPHOCYTES # BLD AUTO: 1.6 K/UL
LYMPHOCYTES NFR BLD: 24.7 %
MCH RBC QN AUTO: 29.7 PG
MCHC RBC AUTO-ENTMCNC: 30.8 G/DL
MCV RBC AUTO: 96 FL
MONOCYTES # BLD AUTO: 0.8 K/UL
MONOCYTES NFR BLD: 12.6 %
NEUTROPHILS # BLD AUTO: 3.7 K/UL
NEUTROPHILS NFR BLD: 59.1 %
NONHDLC SERPL-MCNC: 158 MG/DL
NRBC BLD-RTO: 0 /100 WBC
PLATELET # BLD AUTO: 229 K/UL
PMV BLD AUTO: 11.2 FL
POTASSIUM SERPL-SCNC: 4.7 MMOL/L
PROT SERPL-MCNC: 8.3 G/DL
RBC # BLD AUTO: 4.45 M/UL
SODIUM SERPL-SCNC: 141 MMOL/L
TRIGL SERPL-MCNC: 91 MG/DL
TSH SERPL DL<=0.005 MIU/L-ACNC: 2.08 UIU/ML
WBC # BLD AUTO: 6.27 K/UL

## 2018-11-12 PROCEDURE — 36415 COLL VENOUS BLD VENIPUNCTURE: CPT

## 2018-11-12 PROCEDURE — 84443 ASSAY THYROID STIM HORMONE: CPT

## 2018-11-12 PROCEDURE — 80053 COMPREHEN METABOLIC PANEL: CPT

## 2018-11-12 PROCEDURE — 85025 COMPLETE CBC W/AUTO DIFF WBC: CPT

## 2018-11-12 PROCEDURE — 80061 LIPID PANEL: CPT

## 2019-02-15 DIAGNOSIS — G31.84 MILD COGNITIVE IMPAIRMENT WITH MEMORY LOSS: ICD-10-CM

## 2019-02-15 RX ORDER — RIVASTIGMINE TARTRATE 1.5 MG/1
CAPSULE ORAL
Qty: 60 CAPSULE | Refills: 11 | Status: SHIPPED | OUTPATIENT
Start: 2019-02-15 | End: 2019-05-29 | Stop reason: DRUGHIGH

## 2019-03-30 DIAGNOSIS — I10 ESSENTIAL HYPERTENSION: ICD-10-CM

## 2019-04-01 RX ORDER — AMLODIPINE BESYLATE 5 MG/1
TABLET ORAL
Qty: 90 TABLET | Refills: 1 | Status: SHIPPED | OUTPATIENT
Start: 2019-04-01

## 2019-04-29 ENCOUNTER — OFFICE VISIT (OUTPATIENT)
Dept: INTERNAL MEDICINE | Facility: CLINIC | Age: 79
End: 2019-04-29
Payer: MEDICARE

## 2019-04-29 ENCOUNTER — LAB VISIT (OUTPATIENT)
Dept: LAB | Facility: HOSPITAL | Age: 79
End: 2019-04-29
Attending: FAMILY MEDICINE
Payer: MEDICARE

## 2019-04-29 VITALS
BODY MASS INDEX: 24.55 KG/M2 | WEIGHT: 130.06 LBS | OXYGEN SATURATION: 97 % | SYSTOLIC BLOOD PRESSURE: 124 MMHG | HEIGHT: 61 IN | HEART RATE: 56 BPM | DIASTOLIC BLOOD PRESSURE: 76 MMHG | TEMPERATURE: 97 F

## 2019-04-29 DIAGNOSIS — G30.1 LATE ONSET ALZHEIMER'S DISEASE WITH BEHAVIORAL DISTURBANCE: ICD-10-CM

## 2019-04-29 DIAGNOSIS — I10 ESSENTIAL HYPERTENSION: ICD-10-CM

## 2019-04-29 DIAGNOSIS — I10 ESSENTIAL HYPERTENSION: Primary | ICD-10-CM

## 2019-04-29 DIAGNOSIS — E78.5 HYPERLIPIDEMIA, UNSPECIFIED HYPERLIPIDEMIA TYPE: ICD-10-CM

## 2019-04-29 DIAGNOSIS — F02.818 LATE ONSET ALZHEIMER'S DISEASE WITH BEHAVIORAL DISTURBANCE: ICD-10-CM

## 2019-04-29 PROCEDURE — 99999 PR PBB SHADOW E&M-EST. PATIENT-LVL IV: ICD-10-PCS | Mod: PBBFAC,HCNC,, | Performed by: NURSE PRACTITIONER

## 2019-04-29 PROCEDURE — 1101F PR PT FALLS ASSESS DOC 0-1 FALLS W/OUT INJ PAST YR: ICD-10-PCS | Mod: HCNC,CPTII,S$GLB, | Performed by: NURSE PRACTITIONER

## 2019-04-29 PROCEDURE — 80048 BASIC METABOLIC PNL TOTAL CA: CPT | Mod: HCNC

## 2019-04-29 PROCEDURE — 3078F PR MOST RECENT DIASTOLIC BLOOD PRESSURE < 80 MM HG: ICD-10-PCS | Mod: HCNC,CPTII,S$GLB, | Performed by: NURSE PRACTITIONER

## 2019-04-29 PROCEDURE — 99499 RISK ADDL DX/OHS AUDIT: ICD-10-PCS | Mod: HCNC,S$GLB,, | Performed by: NURSE PRACTITIONER

## 2019-04-29 PROCEDURE — 3078F DIAST BP <80 MM HG: CPT | Mod: HCNC,CPTII,S$GLB, | Performed by: NURSE PRACTITIONER

## 2019-04-29 PROCEDURE — 99213 OFFICE O/P EST LOW 20 MIN: CPT | Mod: HCNC,S$GLB,, | Performed by: NURSE PRACTITIONER

## 2019-04-29 PROCEDURE — 99499 UNLISTED E&M SERVICE: CPT | Mod: HCNC,S$GLB,, | Performed by: NURSE PRACTITIONER

## 2019-04-29 PROCEDURE — 99999 PR PBB SHADOW E&M-EST. PATIENT-LVL IV: CPT | Mod: PBBFAC,HCNC,, | Performed by: NURSE PRACTITIONER

## 2019-04-29 PROCEDURE — 99213 PR OFFICE/OUTPT VISIT, EST, LEVL III, 20-29 MIN: ICD-10-PCS | Mod: HCNC,S$GLB,, | Performed by: NURSE PRACTITIONER

## 2019-04-29 PROCEDURE — 3074F PR MOST RECENT SYSTOLIC BLOOD PRESSURE < 130 MM HG: ICD-10-PCS | Mod: HCNC,CPTII,S$GLB, | Performed by: NURSE PRACTITIONER

## 2019-04-29 PROCEDURE — 3074F SYST BP LT 130 MM HG: CPT | Mod: HCNC,CPTII,S$GLB, | Performed by: NURSE PRACTITIONER

## 2019-04-29 PROCEDURE — 36415 COLL VENOUS BLD VENIPUNCTURE: CPT | Mod: HCNC

## 2019-04-29 PROCEDURE — 1101F PT FALLS ASSESS-DOCD LE1/YR: CPT | Mod: HCNC,CPTII,S$GLB, | Performed by: NURSE PRACTITIONER

## 2019-04-29 RX ORDER — TRAZODONE HYDROCHLORIDE 50 MG/1
TABLET ORAL
Refills: 8 | COMMUNITY
Start: 2019-02-15

## 2019-04-29 NOTE — PATIENT INSTRUCTIONS
Per Dr. Cobb' last note, patient to be taking 3 mg Exelon twice daily.  He is also taking 50 mg Seroquel for dementia with behavior disturbance.

## 2019-04-29 NOTE — PROGRESS NOTES
[facility]  04/29/2019  [id number]    Taylor Bone MD  Patient Care Team:  Taylor Bone MD as PCP - General (Family Medicine)  Taylor Bone MD as Consulting Physician (Family Medicine)  Yany Palmer LPN as Care Coordinator (Internal Medicine)  Has the patient seen any provider outside of the [facility] network since the last visit? (no). If yes, HIPPA forms completed and records requested.        Visit Type:a scheduled routine follow-up visit    Chief Complaint:  Chief Complaint   Patient presents with    6 month followup       History of Present Illness:    Patient presents to clinic today for follow up of chronic conditions including HTN and HLD. Daughter is present to translate. They are without concern.     History:  Past Medical History:   Diagnosis Date    Allergic rhinitis     Dementia     Elevated PSA     followed by Urology    Erectile dysfunction     Hyperlipidemia     Osteoarthritis     Tobacco use      Past Surgical History:   Procedure Laterality Date    left knee scope       No family history on file.  Social History     Socioeconomic History    Marital status:      Spouse name: Not on file    Number of children: Not on file    Years of education: Not on file    Highest education level: Not on file   Occupational History    Occupation: Retired   Social Needs    Financial resource strain: Not on file    Food insecurity:     Worry: Not on file     Inability: Not on file    Transportation needs:     Medical: Not on file     Non-medical: Not on file   Tobacco Use    Smoking status: Former Smoker     Packs/day: 0.10     Years: 23.00     Pack years: 2.30    Smokeless tobacco: Never Used   Substance and Sexual Activity    Alcohol use: Yes     Comment: Occasionally    Drug use: No    Sexual activity: Never   Lifestyle    Physical activity:     Days per week: Not on file     Minutes per session: Not on file    Stress: Not on file   Relationships    Social  connections:     Talks on phone: Not on file     Gets together: Not on file     Attends Christian service: Not on file     Active member of club or organization: Not on file     Attends meetings of clubs or organizations: Not on file     Relationship status: Not on file   Other Topics Concern    Not on file   Social History Narrative    Not on file     Patient Active Problem List   Diagnosis    Osteoarthritis    Allergic rhinitis    Erectile dysfunction    Tobacco use    Late onset Alzheimer's disease with behavioral disturbance    Essential hypertension     Review of patient's allergies indicates:   Allergen Reactions    No known drug allergies        The following were reviewed at this visit: active problem list, medication list, allergies, family history, social history, and health maintenance.    Medications:  Current Outpatient Medications on File Prior to Visit   Medication Sig Dispense Refill    amLODIPine (NORVASC) 5 MG tablet TAKE 1 TABLET(5 MG) BY MOUTH EVERY DAY 90 tablet 1    QUEtiapine (SEROQUEL) 50 MG tablet Take 1 tablet (50 mg total) by mouth every evening. 30 tablet 11    rivastigmine tartrate (EXELON) 1.5 MG capsule TAKE 1 CAPSULE(1.5 MG) BY MOUTH TWICE DAILY 60 capsule 11    traZODone (DESYREL) 50 MG tablet   8    rivastigmine tartrate (EXELON) 3 MG capsule TK ONE C PO BID  11     No current facility-administered medications on file prior to visit.        Medications have been reviewed and reconciled with patient at this visit.  Barriers to medications present (no)    Adverse reactions to current medications (no)    Over the counter medications reviewed (Yes ), and if needed added to active Medication list at this visit.     Exam:  Wt Readings from Last 3 Encounters:   04/29/19 59 kg (130 lb 1.1 oz)   10/29/18 59.3 kg (130 lb 11.7 oz)   06/11/18 59.1 kg (130 lb 4.7 oz)     Temp Readings from Last 3 Encounters:   04/29/19 97.4 °F (36.3 °C) (Tympanic)   10/29/18 97.1 °F (36.2 °C)  (Tympanic)   10/25/17 97.7 °F (36.5 °C) (Tympanic)     BP Readings from Last 3 Encounters:   04/29/19 124/76   10/29/18 128/70   06/11/18 132/70     Pulse Readings from Last 3 Encounters:   04/29/19 (!) 56   10/29/18 63   06/11/18 60     Body mass index is 24.58 kg/m².      Review of Systems   Constitutional: Negative for chills, fever, malaise/fatigue and weight loss.   Respiratory: Negative for cough and shortness of breath.    Cardiovascular: Negative for chest pain and palpitations.   Psychiatric/Behavioral: Positive for memory loss. Negative for substance abuse and suicidal ideas. The patient has insomnia.      Physical Exam   Constitutional: He appears well-developed and well-nourished. No distress.   HENT:   Head: Normocephalic and atraumatic.   Cardiovascular: Normal rate and regular rhythm.   Pulmonary/Chest: Effort normal and breath sounds normal.   Neurological: He is alert.   Skin: He is not diaphoretic.   Psychiatric: He has a normal mood and affect.   Vitals reviewed.      Laboratory Reviewed ({Yes)  Lab Results   Component Value Date    WBC 6.27 11/12/2018    HGB 13.2 (L) 11/12/2018    HCT 42.8 11/12/2018     11/12/2018    CHOL 198 11/12/2018    TRIG 91 11/12/2018    HDL 40 11/12/2018    ALT 30 11/12/2018    AST 25 11/12/2018     11/12/2018    K 4.7 11/12/2018     11/12/2018    CREATININE 1.0 11/12/2018    BUN 20 11/12/2018    CO2 25 11/12/2018    TSH 2.082 11/12/2018    PSA 1.58 07/13/2012       Doll was seen today for 6 month followup.    Diagnoses and all orders for this visit:    Essential hypertension  Comments:  controlled, continue amlodipine  Orders:  -     Basic metabolic panel; Future    Late onset Alzheimer's disease with behavioral disturbance  Comments:  followed by neuro    Hyperlipidemia, unspecified hyperlipidemia type  Comments:  controlled, not on medications, check annually           Care Plan/Goals: Reviewed  (N/A)  Goals     None          Follow up: Follow up in  about 6 months (around 10/29/2019), or if symptoms worsen or fail to improve, for annual wellness/EPP with Dr. Bone.    After visit summary was printed and given to patient upon discharge today.  Patient goals and care plan are included in After Visit Summary.

## 2019-04-30 ENCOUNTER — TELEPHONE (OUTPATIENT)
Dept: INTERNAL MEDICINE | Facility: CLINIC | Age: 79
End: 2019-04-30

## 2019-04-30 LAB
ANION GAP SERPL CALC-SCNC: 12 MMOL/L (ref 8–16)
BUN SERPL-MCNC: 13 MG/DL (ref 8–23)
CALCIUM SERPL-MCNC: 9.2 MG/DL (ref 8.7–10.5)
CHLORIDE SERPL-SCNC: 104 MMOL/L (ref 95–110)
CO2 SERPL-SCNC: 23 MMOL/L (ref 23–29)
CREAT SERPL-MCNC: 1 MG/DL (ref 0.5–1.4)
EST. GFR  (AFRICAN AMERICAN): >60 ML/MIN/1.73 M^2
EST. GFR  (NON AFRICAN AMERICAN): >60 ML/MIN/1.73 M^2
GLUCOSE SERPL-MCNC: 81 MG/DL (ref 70–110)
POTASSIUM SERPL-SCNC: 4.1 MMOL/L (ref 3.5–5.1)
SODIUM SERPL-SCNC: 139 MMOL/L (ref 136–145)

## 2019-04-30 NOTE — TELEPHONE ENCOUNTER
----- Message from Kylee King NP sent at 4/30/2019 10:46 AM CDT -----  Please notify patient/family BMP normal

## 2019-05-29 ENCOUNTER — OFFICE VISIT (OUTPATIENT)
Dept: NEUROLOGY | Facility: CLINIC | Age: 79
End: 2019-05-29
Payer: MEDICARE

## 2019-05-29 VITALS
WEIGHT: 126.56 LBS | BODY MASS INDEX: 23.9 KG/M2 | HEIGHT: 61 IN | SYSTOLIC BLOOD PRESSURE: 122 MMHG | DIASTOLIC BLOOD PRESSURE: 80 MMHG

## 2019-05-29 DIAGNOSIS — G31.84 MILD COGNITIVE IMPAIRMENT WITH MEMORY LOSS: ICD-10-CM

## 2019-05-29 DIAGNOSIS — F03.91 DEMENTIA WITH BEHAVIORAL DISTURBANCE, UNSPECIFIED DEMENTIA TYPE: ICD-10-CM

## 2019-05-29 DIAGNOSIS — F02.818 LATE ONSET ALZHEIMER'S DISEASE WITH BEHAVIORAL DISTURBANCE: Primary | ICD-10-CM

## 2019-05-29 DIAGNOSIS — G30.1 LATE ONSET ALZHEIMER'S DISEASE WITH BEHAVIORAL DISTURBANCE: Primary | ICD-10-CM

## 2019-05-29 PROCEDURE — 99999 PR PBB SHADOW E&M-EST. PATIENT-LVL III: CPT | Mod: PBBFAC,HCNC,, | Performed by: PSYCHIATRY & NEUROLOGY

## 2019-05-29 PROCEDURE — 99999 PR PBB SHADOW E&M-EST. PATIENT-LVL III: ICD-10-PCS | Mod: PBBFAC,HCNC,, | Performed by: PSYCHIATRY & NEUROLOGY

## 2019-05-29 PROCEDURE — 1101F PR PT FALLS ASSESS DOC 0-1 FALLS W/OUT INJ PAST YR: ICD-10-PCS | Mod: HCNC,CPTII,S$GLB, | Performed by: PSYCHIATRY & NEUROLOGY

## 2019-05-29 PROCEDURE — 3074F PR MOST RECENT SYSTOLIC BLOOD PRESSURE < 130 MM HG: ICD-10-PCS | Mod: HCNC,CPTII,S$GLB, | Performed by: PSYCHIATRY & NEUROLOGY

## 2019-05-29 PROCEDURE — 3079F PR MOST RECENT DIASTOLIC BLOOD PRESSURE 80-89 MM HG: ICD-10-PCS | Mod: HCNC,CPTII,S$GLB, | Performed by: PSYCHIATRY & NEUROLOGY

## 2019-05-29 PROCEDURE — 3074F SYST BP LT 130 MM HG: CPT | Mod: HCNC,CPTII,S$GLB, | Performed by: PSYCHIATRY & NEUROLOGY

## 2019-05-29 PROCEDURE — 99214 OFFICE O/P EST MOD 30 MIN: CPT | Mod: HCNC,S$GLB,, | Performed by: PSYCHIATRY & NEUROLOGY

## 2019-05-29 PROCEDURE — 3079F DIAST BP 80-89 MM HG: CPT | Mod: HCNC,CPTII,S$GLB, | Performed by: PSYCHIATRY & NEUROLOGY

## 2019-05-29 PROCEDURE — 1101F PT FALLS ASSESS-DOCD LE1/YR: CPT | Mod: HCNC,CPTII,S$GLB, | Performed by: PSYCHIATRY & NEUROLOGY

## 2019-05-29 PROCEDURE — 99214 PR OFFICE/OUTPT VISIT, EST, LEVL IV, 30-39 MIN: ICD-10-PCS | Mod: HCNC,S$GLB,, | Performed by: PSYCHIATRY & NEUROLOGY

## 2019-05-29 RX ORDER — QUETIAPINE FUMARATE 100 MG/1
100 TABLET, FILM COATED ORAL NIGHTLY
Qty: 90 TABLET | Refills: 3 | Status: SHIPPED | OUTPATIENT
Start: 2019-05-29 | End: 2020-05-28

## 2019-05-29 RX ORDER — RIVASTIGMINE TARTRATE 3 MG/1
CAPSULE ORAL
Qty: 60 CAPSULE | Refills: 11 | Status: SHIPPED | OUTPATIENT
Start: 2019-05-29
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
## 2019-08-12 ENCOUNTER — TELEPHONE (OUTPATIENT)
Dept: NEUROLOGY | Facility: CLINIC | Age: 79
End: 2019-08-12

## 2019-08-12 ENCOUNTER — TELEPHONE (OUTPATIENT)
Dept: INTERNAL MEDICINE | Facility: CLINIC | Age: 79
End: 2019-08-12

## 2019-08-12 ENCOUNTER — OFFICE VISIT (OUTPATIENT)
Dept: INTERNAL MEDICINE | Facility: CLINIC | Age: 79
End: 2019-08-12
Payer: MEDICARE

## 2019-08-12 VITALS
BODY MASS INDEX: 24.74 KG/M2 | TEMPERATURE: 98 F | SYSTOLIC BLOOD PRESSURE: 138 MMHG | WEIGHT: 130.94 LBS | DIASTOLIC BLOOD PRESSURE: 82 MMHG

## 2019-08-12 DIAGNOSIS — G30.1 LATE ONSET ALZHEIMER'S DISEASE WITH BEHAVIORAL DISTURBANCE: Primary | ICD-10-CM

## 2019-08-12 DIAGNOSIS — F02.818 LATE ONSET ALZHEIMER'S DISEASE WITH BEHAVIORAL DISTURBANCE: Primary | ICD-10-CM

## 2019-08-12 PROCEDURE — 3075F PR MOST RECENT SYSTOLIC BLOOD PRESS GE 130-139MM HG: ICD-10-PCS | Mod: HCNC,CPTII,S$GLB, | Performed by: FAMILY MEDICINE

## 2019-08-12 PROCEDURE — 1101F PT FALLS ASSESS-DOCD LE1/YR: CPT | Mod: HCNC,CPTII,S$GLB, | Performed by: FAMILY MEDICINE

## 2019-08-12 PROCEDURE — 99214 PR OFFICE/OUTPT VISIT, EST, LEVL IV, 30-39 MIN: ICD-10-PCS | Mod: HCNC,S$GLB,, | Performed by: FAMILY MEDICINE

## 2019-08-12 PROCEDURE — 3075F SYST BP GE 130 - 139MM HG: CPT | Mod: HCNC,CPTII,S$GLB, | Performed by: FAMILY MEDICINE

## 2019-08-12 PROCEDURE — 3079F DIAST BP 80-89 MM HG: CPT | Mod: HCNC,CPTII,S$GLB, | Performed by: FAMILY MEDICINE

## 2019-08-12 PROCEDURE — 3079F PR MOST RECENT DIASTOLIC BLOOD PRESSURE 80-89 MM HG: ICD-10-PCS | Mod: HCNC,CPTII,S$GLB, | Performed by: FAMILY MEDICINE

## 2019-08-12 PROCEDURE — 99999 PR PBB SHADOW E&M-EST. PATIENT-LVL III: ICD-10-PCS | Mod: PBBFAC,HCNC,, | Performed by: FAMILY MEDICINE

## 2019-08-12 PROCEDURE — 1101F PR PT FALLS ASSESS DOC 0-1 FALLS W/OUT INJ PAST YR: ICD-10-PCS | Mod: HCNC,CPTII,S$GLB, | Performed by: FAMILY MEDICINE

## 2019-08-12 PROCEDURE — 99999 PR PBB SHADOW E&M-EST. PATIENT-LVL III: CPT | Mod: PBBFAC,HCNC,, | Performed by: FAMILY MEDICINE

## 2019-08-12 PROCEDURE — 99214 OFFICE O/P EST MOD 30 MIN: CPT | Mod: HCNC,S$GLB,, | Performed by: FAMILY MEDICINE
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
## 2019-08-12 NOTE — TELEPHONE ENCOUNTER
I spoke with patients daughter, Jay Arreola to check on the status of  pt and his wife. She states she has made arrangements for pt to stay with her and pts wife will remain at their home.  nurse Elizabeth was there while I spoke with daughter. Elizabeth states she thinks pt would benefits from a sitter services. However, they looked up a few and will be too costly for family. So did recommend case mgmt from our social workers to reach out to him/family. She is there doing the assessment but feels pt may not need medical assistance. She will complete her assesment and share with PCP once done.
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
## 2019-08-12 NOTE — PATIENT INSTRUCTIONS
I've ordered home health to send  out to evaluate home situation and assist with placement in nursing home for patient.     It is imperative that his wife be  to another living situation for her own safety. Jay Smith has agreed to move her mother-in-law to her daughter's home for her safety. If she is not moved away from patient, I will contact Elderly Protective Services to evaluate the situation.
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
## 2019-08-12 NOTE — ASSESSMENT & PLAN NOTE
Discussed with GIL, Jay Smith, that it is imperative that patient and his wife be  for her safety; advised that I will put in home health order with social work consult to evaluate home situation and assist with nursing home placement for patient; advised that his wife must be removed from home for her safety at this time; GIL understands they are not to be left together; GIL states that she can move her to her daughter's home; We have agreed that we will check in with her this afternoon or first thing in the morning to make sure patient's wife has been removed from the situation for her safety; if this has not occurred we have agreed that I will call EPS to address the situation. I have typed these instructions in patient instructions for her to share with couples children. I will also notify Dr. Cobb regarding patient's behavior and sleep difficulty per GIL request.

## 2019-08-13 ENCOUNTER — TELEPHONE (OUTPATIENT)
Dept: HEMATOLOGY/ONCOLOGY | Facility: CLINIC | Age: 79
End: 2019-08-13

## 2019-08-13 ENCOUNTER — TELEPHONE (OUTPATIENT)
Dept: NEUROLOGY | Facility: CLINIC | Age: 79
End: 2019-08-13

## 2019-08-13 NOTE — TELEPHONE ENCOUNTER
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 Loni states pt needs more than HH and needs placement into a nursing home.     Case management at [facility] can provider  chip
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
## 2019-08-13 NOTE — TELEPHONE ENCOUNTER
----- Message from Gene Cobb MD sent at 8/11/2019  8:12 PM CDT -----  Contact: Daughter- Ms [phone]  Home health will not sit with the patient.  They will just check on him, check his blood pressure, and then leave.  If he is out of control, I would suggest referral to nursing home or perhaps a behavioral health unit.  ----- Message -----  From: Donna Jefferson MA  Sent: 8/9/2019   4:33 PM  To: Gene Cobb MD    Please advise would home health help?   ----- Message -----  From: Eva Aldridge  Sent: 8/9/2019   4:22 PM  To: Reza GAVIN Staff    Would like to consult with the nurse, Daughter would like to know if the Patient can get home health nurse to come out and sit with the Patient , Daughter state the patient is out of controlled, please call back at [phone], thanks sj

## 2019-08-14 ENCOUNTER — TELEPHONE (OUTPATIENT)
Dept: NEUROLOGY | Facility: CLINIC | Age: 79
End: 2019-08-14

## 2019-08-14 NOTE — TELEPHONE ENCOUNTER
----- Message from Kennedi Carrillo sent at 8/14/2019 12:49 PM CDT -----  Type:  Needs Medical Advice    Who Called:  Pt  Daughter  (Ms Smith)  Symptoms (please be specific):        How long has patient had these symptoms:   Pharmacy name and phone #:   Would the patient rather a call back or a response via [facility]?  Call back  Best Call Back Number:   [phone]  Additional Information:  States she calling regarding pt//not clear as to what she is wanting//pt  Has memory problem//please call//thanks/shashank

## 2019-08-16 ENCOUNTER — OUTPATIENT CASE MANAGEMENT (OUTPATIENT)
Dept: ADMINISTRATIVE | Facility: OTHER | Age: 79
End: 2019-08-16

## 2019-08-16 NOTE — PROGRESS NOTES
LMSW contacted Pt's daughter in law, Sarah Thompson regarding referral. Pt's daughter's reports was Pt admitted to [facility] on  Wednesday and [facility] yesterday morning. Pt's daughter reports is currently being evaluated at the geriatric psych unit. Pt's daughter reports she has been contacted by  at [facility] and they have discussed possible transfer of Pt to a nursing home. Pt's daughter reports she was advised this process can take a while and Pt may have to be sent home if approval from facility or/and Medicaid received soon. Pt's daughter reports she has been communicating with Oncology social worker and hospital staff to try to identify placement for Pt. LMSW encouraged Pt's daughter to continue working with  at [facility] and advised her LMSW can follow up with her next week to determine if Pt was transferred to a nursing facility this weekend. Pt's daughter voiced understanding and agreement with this plan. She was provided with LMSW contact information and reports she will notify  of any updates.

## 2019-08-19 ENCOUNTER — TELEPHONE (OUTPATIENT)
Dept: HEMATOLOGY/ONCOLOGY | Facility: CLINIC | Age: 79
End: 2019-08-19

## 2019-08-20 ENCOUNTER — TELEPHONE (OUTPATIENT)
Dept: INTERNAL MEDICINE | Facility: CLINIC | Age: 79
End: 2019-08-20

## 2019-08-20 NOTE — TELEPHONE ENCOUNTER
----- Message from Lexis Longo LMSW sent at 8/20/2019 10:30 AM CDT -----  This LMSW received a referral on the above patient. LEISW contacted patient caregiver regarding referral. Pt caregiver reports Pt is currently admitted to [facility] and reports nursing home placement is current discharge plan.     Thank you for the referral,    Lexis Longo LMSW

## 2019-08-20 NOTE — TELEPHONE ENCOUNTER
8/16/2019    SRIRAM was contacted by pt to be informed that no representative contact pt's daughter regarding placement for her father at [facility]. SRIRAM informed pt's daughter that she will reach out to [facility] admissions coordinator Shi to discuss the issues concerning my pt.     SRIRAM will f/u with pt's daughter tmrw on August 20.

## 2019-08-20 NOTE — PROGRESS NOTES
LMSW contacted Pt's caregiver to follow up on Pt. Pt's caregiver reports Pt continues to be inpatient at [facility]. Pt's caregiver reports discharge disposition continues to be nursing home placement. Pt's caregiver advised to contact  if  Pt discharged home from hospital and additional assistance needed with placement. Low/mod risk referral closed at this time. Referral source notified.

## 2019-08-21 ENCOUNTER — TELEPHONE (OUTPATIENT)
Dept: HEMATOLOGY/ONCOLOGY | Facility: CLINIC | Age: 79
End: 2019-08-21

## 2019-08-21 NOTE — TELEPHONE ENCOUNTER
SRIRAM contacted pt's daughter, Jay Smith, to determine if [facility] had ever made contact with pt. Jay stated that she has reached out to [facility] and had not heard anything from them since SRIRAM first introducing the facility to the daughter.Jay stated that she was driving to the [facility] to determine if the facility is interested in admitting her father. Jay requested SW assistance with finding a residential facility. SRIRAM agreed to contacting Jay Smith before the end of the day.     Addendum:    SRIRAM attempted to contact pt's daughter, Jay Smith to inform pt of a potential alzheimer residential care: [facility]. [facility] admissions coordinator, Katiana [phone] stated that she does have one bed available in the secured unit. SRIRAM explained that pt does not have long term medicaid but Katiana stated they she can help with the Medicaid application. Katiana is concerned about pt being able to communicate the needs to the staff. Katiana stated that she would consult with her supervisor to determine if a language line is available, and requested that her number be given to pt's daughter for a phone call tmrw morning. SRIRAM will attempt to f/u with Jay tmrw to determine which facility.

## 2019-08-22 ENCOUNTER — TELEPHONE (OUTPATIENT)
Dept: HEMATOLOGY/ONCOLOGY | Facility: CLINIC | Age: 79
End: 2019-08-22

## 2019-08-22 NOTE — TELEPHONE ENCOUNTER
SRIRAM contacted pt's daughter to discuss her visit at [facility] on yesterday. Jay stated that there were issues with admissions process and that she would have to do private pay since pt is admitted at [facility] currently. SRIRAM informed Jay that she has contacted [facility], admission's coordinator, Katiana, to discuss a possible admission. SRIRAM provided Jay with Katiana contact info and Jay stated that she would contact Katiana after our phone conversation before she make a visit to the facility. Jay plans to f/u with SRIRAM by the end of the day. SRIRAM provided Jay with [facility] contact info.     F/u by the end of the day if Jay has not reached out to SRIRAM.

## 2019-08-26 ENCOUNTER — SSC ENCOUNTER (OUTPATIENT)
Dept: ADMINISTRATIVE | Facility: OTHER | Age: 79
End: 2019-08-26

## 2019-08-26 NOTE — PROGRESS NOTES
Patient's information has been sent to the low/moderate OPCM team for assessment.     Reason for referral:  [employer] e-mail: Daughter is Tung Le and she states he has worsening Alzheimers/dementia and is in need of care coordination/case management. Best number for daughter is [phone]. Thank you!      Please contact \[facility]\"" with any questions (ext. [phone]).     Thank you,    Melissa Lopez    Outpatient Case Management

## 2019-08-28 ENCOUNTER — OUTPATIENT CASE MANAGEMENT (OUTPATIENT)
Dept: ADMINISTRATIVE | Facility: OTHER | Age: 79
End: 2019-08-28

## 2019-08-28 NOTE — LETTER
August 29, 2019    Doll Vanegas  [address]  [place] [address]             [facility] [address] [place] [address] Dear Mr. Vanegas:    I am writing from the Outpatient Complex Care Management Department at [facility].  I received a referral from [employer] to contact you or your caregiver regarding any needs you may have. I have attempted to contact you or your cargeiver by phone two times unsuccessfully.  Please contact the Outpatient Complex Care Management Department at [phone] if you would like to discuss your needs.      Sincerely,         Lexis Longo, [facility]

## 2019-08-29 NOTE — PROGRESS NOTES
This LMSW attempted to reach patient/caregiver to provide resource and left msg requesting a return call. Letter with contact information was sent via US patient/caregiver. Referral source notified.

## 2019-09-30 PROBLEM — G47.00 INSOMNIA: Status: ACTIVE | Noted: 2019-09-03

## 2019-09-30 PROBLEM — F39 MOOD DISORDER: Status: ACTIVE | Noted: 2019-09-03

## 2019-09-30 PROBLEM — F03.90 DEMENTIA: Status: ACTIVE | Noted: 2019-09-03
